# Patient Record
Sex: MALE | URBAN - NONMETROPOLITAN AREA
[De-identification: names, ages, dates, MRNs, and addresses within clinical notes are randomized per-mention and may not be internally consistent; named-entity substitution may affect disease eponyms.]

---

## 2021-02-17 RX ORDER — LEVOFLOXACIN 500 MG/1
TABLET, FILM COATED ORAL
COMMUNITY
Start: 2020-11-27 | End: 2021-02-24 | Stop reason: ALTCHOICE

## 2021-02-17 RX ORDER — LISINOPRIL 5 MG/1
5 TABLET ORAL DAILY
COMMUNITY
Start: 2021-01-13 | End: 2021-07-12

## 2021-02-17 RX ORDER — ATORVASTATIN CALCIUM 20 MG/1
20 TABLET, FILM COATED ORAL NIGHTLY
COMMUNITY
Start: 2021-01-13 | End: 2022-01-13

## 2021-02-17 RX ORDER — CLOPIDOGREL BISULFATE 75 MG/1
75 TABLET ORAL DAILY
COMMUNITY
Start: 2021-01-13 | End: 2021-04-13

## 2021-02-17 RX ORDER — NITROGLYCERIN 2.5 MG/D
1 PATCH TRANSDERMAL DAILY
COMMUNITY

## 2021-02-17 RX ORDER — LORATADINE 10 MG/1
10 TABLET ORAL EVERY 24 HOURS
COMMUNITY

## 2021-02-17 RX ORDER — GLIMEPIRIDE 1 MG/1
1 TABLET ORAL DAILY
COMMUNITY
Start: 2020-06-22 | End: 2021-06-22

## 2021-02-17 RX ORDER — METOPROLOL SUCCINATE 25 MG/1
25 TABLET, EXTENDED RELEASE ORAL DAILY
COMMUNITY
Start: 2020-03-17 | End: 2022-01-13

## 2021-02-17 RX ORDER — ASPIRIN 81 MG/1
81 TABLET ORAL EVERY 24 HOURS
COMMUNITY

## 2021-02-24 ENCOUNTER — OFFICE VISIT (OUTPATIENT)
Dept: PAIN MANAGEMENT | Age: 86
End: 2021-02-24
Payer: MEDICARE

## 2021-02-24 ENCOUNTER — TELEPHONE (OUTPATIENT)
Dept: PAIN MANAGEMENT | Age: 86
End: 2021-02-24

## 2021-02-24 VITALS
DIASTOLIC BLOOD PRESSURE: 64 MMHG | RESPIRATION RATE: 14 BRPM | HEIGHT: 65 IN | WEIGHT: 195 LBS | SYSTOLIC BLOOD PRESSURE: 128 MMHG | BODY MASS INDEX: 32.49 KG/M2

## 2021-02-24 DIAGNOSIS — M51.36 DDD (DEGENERATIVE DISC DISEASE), LUMBAR: ICD-10-CM

## 2021-02-24 DIAGNOSIS — M54.16 LUMBAR RADICULOPATHY: Primary | ICD-10-CM

## 2021-02-24 DIAGNOSIS — M48.061 SPINAL STENOSIS OF LUMBAR REGION, UNSPECIFIED WHETHER NEUROGENIC CLAUDICATION PRESENT: ICD-10-CM

## 2021-02-24 PROCEDURE — 4040F PNEUMOC VAC/ADMIN/RCVD: CPT | Performed by: NURSE PRACTITIONER

## 2021-02-24 PROCEDURE — G8417 CALC BMI ABV UP PARAM F/U: HCPCS | Performed by: NURSE PRACTITIONER

## 2021-02-24 PROCEDURE — G8427 DOCREV CUR MEDS BY ELIG CLIN: HCPCS | Performed by: NURSE PRACTITIONER

## 2021-02-24 PROCEDURE — 1123F ACP DISCUSS/DSCN MKR DOCD: CPT | Performed by: NURSE PRACTITIONER

## 2021-02-24 PROCEDURE — 1036F TOBACCO NON-USER: CPT | Performed by: NURSE PRACTITIONER

## 2021-02-24 PROCEDURE — G8484 FLU IMMUNIZE NO ADMIN: HCPCS | Performed by: NURSE PRACTITIONER

## 2021-02-24 PROCEDURE — 99204 OFFICE O/P NEW MOD 45 MIN: CPT | Performed by: NURSE PRACTITIONER

## 2021-02-24 ASSESSMENT — ENCOUNTER SYMPTOMS
CONSTIPATION: 0
SHORTNESS OF BREATH: 1
BACK PAIN: 1
DIARRHEA: 0

## 2021-02-24 NOTE — PATIENT INSTRUCTIONS
[facility]  [address], [place]    PROCEDURE INSTRUCTIONS FOR PAIN MANAGEMENT PROCEDURES    You are scheduled to see Dr. Uzair Varghese to undergo the following procedure:       Procedure Date: ________  Arrival Time: You will receive a call from [facility] to inform you of your procedure time    Enter the facility through the ER doors and take the elevator to the 2nd floor and check in at same day surgery. 1. Stop the following medications prior to the procedure:  Aspirin and Plavix (clopidogrel)   Stop blood thinners as directed before the injection, with permission from your cardiologist or primary care physician. We will send a letter to them requesting permission to hold the blood thinners. 2.  Take all routine medications unless otherwise instructed. Ok to take vitamins and antiinflammatory medications    3. EATING & DRINKING:  YOUR PROCEDURE MAY REQUIRE ANESTHESIA, NO FOOD OR LIQUIDS FOR 8 HOURS PRIOR TO THE PROCEDURE    4. If you are allergic to contrast or iodine, you must take benadryl and prednisone prior to the injection to prevent an allergic reaction. Follow the directions on the prescription for the times to take the medication. 5.  Wear simple loose clothing, which can be easily changed. 6.  Leave jewelry (including rings) and other valuables at home. 7.  Make arrangements for a family member or friend to drive you to the surgery center. Your ride must stay in the hospital while you are having the injection done. The sedation may affect your judgment following the procedure and driving a vehicle within 24 hours after the sedation could be dangerous. 8.  You will be asked to sign several forms prior to surgery; patients under the age of 25 must have a parent or legal guardian sign the permit to be able to do the procedure. 9.  You must have finished any antibiotic prescribed for recent infections.  If required, please take pre-procedure antibiotic or other pre-procedure medications as instructed. 10. Bring inhalers and pain medications with you to your procedure. 11. Bring your MRI/CT films if they were done outside of the [facility]. 12. If you should develop a cold, sore throat, cough, fever or other new indication of illness or infection, or are started on antibiotics within 2 weeks of the scheduled procedure, please notify the [facility] office as early as possible at [phone].

## 2021-02-24 NOTE — PROGRESS NOTES
Subjective:      Patient ID: Zenon Soto is a 80 y.o. male. Chief Complaint   Patient presents with    Back Pain     had a flare about a month ago, seen ortho, they referred. Pt can not lay down in the bed on either side.  Other     patient has been sleeping the the recliner for about a month due to the pain; does not want to go to PT     HPI Initial new patient consult.      Left low back pain with sciatica, L5 pattern    Pain Assessment  Location of Pain: (hip/leg)  Location Modifiers: Left, Right  Severity of Pain: 8  Quality of Pain: Aching, Sharp  Duration of Pain: (only when trying to sleep)  Frequency of Pain: (when trying to sleep)  Aggravating Factors: (when trying to sleep)  Limiting Behavior: Yes    No Known Allergies    Outpatient Medications Marked as Taking for the 2/24/21 encounter (Office Visit) with MAXINE Crowder CNP   Medication Sig Dispense Refill    aspirin 81 MG EC tablet Take 81 mg by mouth every 24 hours      atorvastatin (LIPITOR) 20 MG tablet Take 20 mg by mouth nightly      clopidogrel (PLAVIX) 75 MG tablet Take 75 mg by mouth daily      glimepiride (AMARYL) 1 MG tablet Take 1 mg by mouth daily      lisinopril (PRINIVIL;ZESTRIL) 5 MG tablet Take 5 mg by mouth daily      loratadine (CLARITIN) 10 MG tablet Take 10 mg by mouth every 24 hours      metoprolol succinate (TOPROL XL) 25 MG extended release tablet Take 25 mg by mouth daily      SITagliptin (JANUVIA) 50 MG tablet Take 50 mg by mouth daily         Past Medical History:   Diagnosis Date    Back pain     Back pain with left-sided radiculopathy     CHF ([address]estive heart failure) ([address]     DDD (degenerative disc disease), lumbar     Diabetes ([address]     Diverticulitis     Gross hematuria     Heart disease     Hip pain, bilateral     History of heart bypass surgery 1996    History of radiation therapy     prostate cancer    Joint pain     Knee joint replacement status, unspecified laterality     [facility] of [facility]     Mumps     Neuropathy in diabetes ([facility]     Skin cancer     Slow to wake up after anesthesia     Spinal stenosis at L4-L5 level        Past Surgical History:   Procedure Laterality Date    CARDIAC SURGERY  1996    Cardiac Triple Bypass    CARDIAC SURGERY  2008    2 Heart Stents    CHOLECYSTECTOMY  1987    COLONOSCOPY  2001    CYSTOSCOPY  12/22/2020    HIATAL HERNIA REPAIR      TOTAL KNEE ARTHROPLASTY Left 2007       Family History   Problem Relation Age of Onset    Cancer Mother         Breast    Cancer Father         Liver    Other Brother         Heart Bypass    No Known Problems Maternal Grandmother     No Known Problems Maternal Grandfather     No Known Problems Paternal Grandmother     No Known Problems Paternal Grandfather     No Known Problems Son     No Known Problems Son     No Known Problems Son     No Known Problems Daughter        Social History     Socioeconomic History    Marital status: Single     Spouse name: None    Number of children: None    Years of education: None    Highest education level: None   Occupational History    None   Social Needs    Financial resource strain: None    Food insecurity     Worry: None     Inability: None    Transportation needs     Medical: None     Non-medical: None   Tobacco Use    Smoking status: Former Smoker    Smokeless tobacco: Never Used   Substance and Sexual Activity    Alcohol use: Not Currently    Drug use: Never    Sexual activity: Not Currently   Lifestyle    Physical activity     Days per week: None     Minutes per session: None    Stress: None   Relationships    Social connections     Talks on phone: None     Gets together: None     Attends Yazidi service: None     Active member of club or organization: None     Attends meetings of clubs or organizations: None     Relationship status: None    Intimate partner violence     Fear of current or ex partner: None     Emotionally abused: None     Physically abused: None     Forced sexual activity: None   Other Topics Concern    None   Social History Narrative    None     Review of Systems   Constitutional: Positive for activity change and fatigue. HENT: Negative for sinus pain and sore throat. Eyes: Negative. Respiratory: Positive for shortness of breath. Cardiovascular: Negative for chest pain. Gastrointestinal: Negative for constipation and diarrhea. Endocrine: Negative. Genitourinary: Negative for difficulty urinating. Musculoskeletal: Positive for arthralgias, back pain and myalgias. Neurological: Positive for numbness. Negative for weakness. Hematological: Bruises/bleeds easily. Psychiatric/Behavioral: Positive for sleep disturbance. Objective:   Physical Exam  Vitals signs reviewed. Constitutional:       General: He is awake. He is not in acute distress. Appearance: Normal appearance. He is well-developed and well-groomed. He is not ill-appearing, toxic-appearing or diaphoretic. Interventions: He is not intubated. HENT:      Head: Normocephalic and atraumatic. Right Ear: External ear normal. Decreased hearing noted. Left Ear: External ear normal. Decreased hearing noted. Nose: Nose normal.      Mouth/Throat:      Lips: Pink. Mouth: Mucous membranes are moist.   Eyes:      General: Lids are normal.   Pulmonary:      Effort: Pulmonary effort is normal. No tachypnea, bradypnea, accessory muscle usage, prolonged expiration, respiratory distress or retractions. He is not intubated. Abdominal:      General: Abdomen is flat and protuberant. Palpations: Abdomen is soft. Musculoskeletal:      Left hip: He exhibits no bony tenderness. Lumbar back: He exhibits pain. Right foot: No Charcot foot or foot drop. Left foot: No Charcot foot or foot drop. Skin:     General: Skin is warm and dry. Capillary Refill: Capillary refill takes less than 2 seconds.

## 2021-02-25 ASSESSMENT — ENCOUNTER SYMPTOMS
EYES NEGATIVE: 1
SINUS PAIN: 0
SORE THROAT: 0

## 2025-01-07 NOTE — TELEPHONE ENCOUNTER
Dr Aby Maher office is faxing over clearance letter. Pt is ok to hold medication for up to five days. Pt has already been holding this medication.   Pt is good to go for Wednesday
Letter was sent to Dr Lurdes Berg office via fax
Patient needs to have caridac hold letter sent to Dr Azael Emmanuel (Cardiology) to hold Plavix and ASA
Spoke with Dr Izabel Young office, they did receive the medication hold, however there has not been a response on this. They will forward again to the doctor.
Spoke with the patient, he has already been hold his ASA and plavix
We still need as answer on this?
No